# Patient Record
Sex: FEMALE | Race: BLACK OR AFRICAN AMERICAN | NOT HISPANIC OR LATINO | Employment: STUDENT | URBAN - METROPOLITAN AREA
[De-identification: names, ages, dates, MRNs, and addresses within clinical notes are randomized per-mention and may not be internally consistent; named-entity substitution may affect disease eponyms.]

---
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
## 2025-01-01 NOTE — TELEPHONE ENCOUNTER
Patient  seen today and diagnosed with strep throat. Patient has only had 1 dose of antibiotic and is not feeling better yet. She has vomited this evening as well. [facility] provided- home care. Instructed to call back with additional questions or worsening of symptoms. Patient verbalized understanding.       Reason for Disposition   [1] Taking antibiotic < 3 days for strep throat AND [2] other strep symptoms not improved    Additional Information   Negative: [1] Difficulty breathing AND [2] severe (struggling for each breath, unable to cry or speak, grunting sounds, severe retractions)   Negative: Fainted or too weak to stand   Negative: Sounds like a life-threatening emergency to the triager   Negative: Difficulty breathing (per caller) but not severe   Negative: [1] Drooling or spitting out saliva (because can't swallow) AND [2] new onset   Negative: [1] Drinking very little AND [2] signs of dehydration (no urine > 12 hours, very dry mouth, no tears, etc.)   Negative: [1] Can't move neck normally AND [2] fever   Negative: [1] Fever > 105 F (40.6 C) by any route OR axillary > 104 F (40 C) AND [2] took antibiotic > 24 hours   Negative: Child sounds very sick or weak to the triager   Negative: [1] Refuses to drink anything AND [2] for > 12 hours   Negative: [1] Can't move neck normally AND [2] no fever   Negative: [1] Age 6 years and older AND [2] complains they can't open mouth normally (without being asked)   Negative: Triager concerned about patient's response to recommended treatment plan   Negative: Pink or tea-colored urine   Negative: [1] Taking antibiotic > 24 hours AND [2] sore throat pain is SEVERE (interferes with function) AND [3] not improved with pain medicine or antibiotic   Negative: [1] Taking antibiotic > 48 hours for strep throat AND [2] fever persists or recurs   Negative: [1] Taking antibiotic > 3 days for strep throat AND [2] other strep symptoms not improved   Negative: [1] Taking antibiotic < 48  hours for strep throat AND [2] fever persists    Protocols used: Strep Throat Infection Follow-up Call-P-[facility]

## 2025-01-10 ENCOUNTER — NURSE TRIAGE (OUTPATIENT)
Dept: ADMINISTRATIVE | Facility: CLINIC | Age: 20
End: 2025-01-10
Payer: COMMERCIAL

## 2025-01-10 NOTE — TELEPHONE ENCOUNTER
"Pt states started on antibiotics 12/31 for strep, pt states has 3 doses left. Pt c/o hives, "red dots all over body, itching." Pt denies difficulty breathing, denies pain or fever. Per protocol, see physician within 24 hours. Pt in between Peds and Adult primary care-- does not have listed PCP at this time. Mom asking for appt with peds office, no appt available per chart. Discussed at home treatment and symptoms to monitor for and advised to call back for any further questions or concerns. Virtual visit offered, mom declines states wants pt to be seen.   Reason for Disposition   Taking new prescription antibiotic  (Exception: Finished taking new prescription antibiotic.)    Additional Information   [1] Drug rash suspected AND [2] started taking new medicine within last 2 weeks(Exception: Antihistamine, eye drops, ear drops, decongestant or other OTC cough/cold medicines.)   Negative: [1] Life-threatening reaction (anaphylaxis) in the past to the same drug AND [2] < 2 hours since exposure   Negative: Difficulty breathing or wheezing   Negative: [1] Hoarseness or cough AND [2] started soon after 1st dose of drug   Negative: [1] Swollen tongue AND [2] started soon after 1st dose of drug   Negative: [1] Purple or blood-colored rash (spots or dots) AND [2] fever   Negative: Sounds like a life-threatening emergency to the triager   Negative: Swollen tongue   Negative: [1] Widespread hives AND [2] onset < 2 hours of exposure to 1st dose of drug   Negative: Fever   Negative: Patient sounds very sick or weak to the triager   Negative: [1] Purple or blood-colored rash (spots or dots) AND [2] no fever AND [3] sounds well to triager   Negative: [1] Taking new prescription medication AND [2] rash within 4 hours of 1st dose   Negative: Large or small blisters on skin (i.e., fluid filled bubbles or sacs)   Negative: Bloody crusts on lips or sores in mouth   Negative: Face or lip swelling   Negative: Hives or itching    Protocols " used: Hives-A-AH, Rash - Widespread On Drugs-A-AH

## 2025-03-28 ENCOUNTER — HOSPITAL ENCOUNTER (EMERGENCY)
Facility: HOSPITAL | Age: 20
Discharge: HOME | End: 2025-03-28
Attending: PEDIATRICS
Payer: COMMERCIAL

## 2025-03-28 VITALS
HEART RATE: 70 BPM | WEIGHT: 104.63 LBS | SYSTOLIC BLOOD PRESSURE: 128 MMHG | TEMPERATURE: 99 F | BODY MASS INDEX: 20.54 KG/M2 | DIASTOLIC BLOOD PRESSURE: 88 MMHG | OXYGEN SATURATION: 98 % | HEIGHT: 60 IN | RESPIRATION RATE: 18 BRPM

## 2025-03-28 DIAGNOSIS — S16.1XXA STRAIN OF NECK MUSCLE, INITIAL ENCOUNTER: ICD-10-CM
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
## 2025-03-28 LAB
B-HCG UR QL: NEGATIVE
CTP QC/QA: YES

## 2025-03-28 PROCEDURE — 96372 THER/PROPH/DIAG INJ SC/IM: CPT

## 2025-03-28 PROCEDURE — 81025 URINE PREGNANCY TEST: CPT

## 2025-03-28 PROCEDURE — 99285 EMERGENCY DEPT VISIT HI MDM: CPT | Mod: 25

## 2025-03-28 PROCEDURE — 63600175 PHARM REV CODE 636 W HCPCS: Mod: JZ,TB

## 2025-03-28 RX ORDER — KETOROLAC TROMETHAMINE 30 MG/ML
15 INJECTION, SOLUTION INTRAMUSCULAR; INTRAVENOUS
Status: COMPLETED | OUTPATIENT
Start: 2025-03-28 | End: 2025-03-28

## 2025-03-28 RX ADMIN — KETOROLAC TROMETHAMINE 15 MG: 30 INJECTION, SOLUTION INTRAMUSCULAR; INTRAVENOUS at 11:03
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
## 2025-03-28 NOTE — Clinical Note
"Chilo "Tamera Castillo was seen and treated in our emergency department on 3/28/2025.  She may return to school on 03/31/2025.      If you have any questions or concerns, please don't hesitate to call.      Melissa Bryan, DO"
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
## 2025-05-12 ENCOUNTER — OFFICE VISIT (OUTPATIENT)
Dept: INTERNAL MEDICINE | Facility: CLINIC | Age: 20
End: 2025-05-12
Payer: COMMERCIAL

## 2025-05-12 VITALS
BODY MASS INDEX: 21.12 KG/M2 | HEIGHT: 60 IN | HEART RATE: 70 BPM | DIASTOLIC BLOOD PRESSURE: 64 MMHG | SYSTOLIC BLOOD PRESSURE: 116 MMHG | WEIGHT: 107.56 LBS | OXYGEN SATURATION: 99 %

## 2025-05-12 DIAGNOSIS — R10.30 LOWER ABDOMINAL PAIN: Primary | ICD-10-CM

## 2025-05-12 DIAGNOSIS — L72.0 EPIDERMAL INCLUSION CYST: ICD-10-CM

## 2025-05-12 DIAGNOSIS — N89.8 VAGINAL DISCHARGE: ICD-10-CM

## 2025-05-12 LAB
B-HCG UR QL: NEGATIVE
BILIRUB UR QL STRIP.AUTO: NEGATIVE
CLARITY UR: CLEAR
COLOR UR AUTO: YELLOW
CTP QC/QA: YES
GLUCOSE UR QL STRIP: NEGATIVE
HGB UR QL STRIP: NEGATIVE
KETONES UR QL STRIP: ABNORMAL
LEUKOCYTE ESTERASE UR QL STRIP: NEGATIVE
NITRITE UR QL STRIP: NEGATIVE
PH UR STRIP: 7 [PH]
PROT UR QL STRIP: ABNORMAL
SP GR UR STRIP: >=1.03
UROBILINOGEN UR STRIP-ACNC: ABNORMAL EU/DL

## 2025-05-12 PROCEDURE — 87591 N.GONORRHOEAE DNA AMP PROB: CPT | Performed by: NURSE PRACTITIONER

## 2025-05-12 PROCEDURE — 99999 PR PBB SHADOW E&M-EST. PATIENT-LVL IV: CPT | Mod: PBBFAC,,, | Performed by: NURSE PRACTITIONER

## 2025-05-12 PROCEDURE — 1159F MED LIST DOCD IN RCRD: CPT | Mod: CPTII,S$GLB,, | Performed by: NURSE PRACTITIONER

## 2025-05-12 PROCEDURE — 3008F BODY MASS INDEX DOCD: CPT | Mod: CPTII,S$GLB,, | Performed by: NURSE PRACTITIONER

## 2025-05-12 PROCEDURE — 81003 URINALYSIS AUTO W/O SCOPE: CPT | Performed by: NURSE PRACTITIONER

## 2025-05-12 PROCEDURE — 81025 URINE PREGNANCY TEST: CPT | Mod: S$GLB,,, | Performed by: NURSE PRACTITIONER

## 2025-05-12 PROCEDURE — 99204 OFFICE O/P NEW MOD 45 MIN: CPT | Mod: S$GLB,,, | Performed by: NURSE PRACTITIONER

## 2025-05-12 PROCEDURE — 1160F RVW MEDS BY RX/DR IN RCRD: CPT | Mod: CPTII,S$GLB,, | Performed by: NURSE PRACTITIONER

## 2025-05-12 PROCEDURE — 3074F SYST BP LT 130 MM HG: CPT | Mod: CPTII,S$GLB,, | Performed by: NURSE PRACTITIONER

## 2025-05-12 PROCEDURE — 3078F DIAST BP <80 MM HG: CPT | Mod: CPTII,S$GLB,, | Performed by: NURSE PRACTITIONER

## 2025-05-12 RX ORDER — DICYCLOMINE HYDROCHLORIDE 10 MG/1
10 CAPSULE ORAL 3 TIMES DAILY PRN
Qty: 30 CAPSULE | Refills: 0 | Status: SHIPPED | OUTPATIENT
Start: 2025-05-12 | End: 2025-06-11

## 2025-05-12 NOTE — PROGRESS NOTES
INTERNAL MEDICINE PROGRESS/URGENT CARE NOTE    CHIEF COMPLAINT     Chief Complaint   Patient presents with    Abdominal Pain       HPI     Chilo Castillo is a 19 y.o. female who presents for an urgent visit today.    New pt    Lower abd pain intermittent x 4 months. Nothing makes it worse or better. Sharp and cramping. Will last a few minutes and resolve on own. No associated fevers, diarrhea, constipation, blood in stool.    She's also been having vaginal discharge intermittently x years. Was treated for yeast in past but states doesn't fully resolve. No fevers or abn vaginal bleeding.   Discharge is white and watery.   She is sexually active.     LMP: 5/1/25    She has also had a nontender cyst on her scalp x years and would like it removed.       Problem List[1]     Past Medical History:  Past Medical History:   Diagnosis Date    Allergy     Bell's palsy 6    Headache(784.0)         Past Surgical History:  Past Surgical History:   Procedure Laterality Date    DENTAL SURGERY      REPAIR, HERNIA, VENTRAL N/A 3/28/2023    Procedure: REPAIR, HERNIA, VENTRAL;  Surgeon: Doris Mccain MD;  Location: [facility] OR [address] [place] [address];  Service: Pediatrics;  Laterality: N/A;        Allergies:  Review of patient's allergies indicates:  No Known Allergies    Home Medications:  Current Medications[2]     Review of Systems:  Review of Systems   Constitutional:  Negative for fever.   HENT:  Negative for congestion and sore throat.    Respiratory:  Negative for cough and shortness of breath.    Cardiovascular:  Negative for chest pain.   Gastrointestinal:  Positive for abdominal pain. Negative for blood in stool, constipation, diarrhea and vomiting.   Genitourinary:  Positive for vaginal discharge. Negative for dysuria, hematuria, pelvic pain and vaginal bleeding.   Musculoskeletal:  Negative for back pain.         PHYSICAL EXAM     Vitals:    05/12/25 1055   BP: 116/64   BP Location: Right arm   Patient Position: Sitting   Pulse: 70  "  SpO2: 99%   Weight: 48.8 kg (107 lb 9.4 oz)   Height: 5' (1.524 m)      Body mass index is 21.01 kg/m².     Physical Exam  Vitals reviewed.   Constitutional:       Appearance: Normal appearance.   HENT:      Head: Normocephalic.      Mouth/Throat:      Mouth: Mucous membranes are moist.      Pharynx: Oropharynx is clear.   Eyes:      Conjunctiva/sclera: Conjunctivae normal.      Pupils: Pupils are equal, round, and reactive to light.   Cardiovascular:      Rate and Rhythm: Normal rate and regular rhythm.   Pulmonary:      Effort: Pulmonary effort is normal.      Breath sounds: Normal breath sounds.   Abdominal:      General: Bowel sounds are normal.      Palpations: Abdomen is soft.      Tenderness: There is no guarding or rebound. Negative signs include Woods's sign, Rovsing's sign, psoas sign and obturator sign.      Comments: Slight tenderness to deep palpation only to RLQ and suprapubic area. Otherwise nontender   Skin:     General: Skin is warm and dry.   Neurological:      General: No focal deficit present.      Mental Status: She is alert.   Psychiatric:         Mood and Affect: Mood normal.         Behavior: Behavior normal.         LABS     No results found for: "LABA1C", "HGBA1C"  CMP  Sodium   Date Value Ref Range Status   03/21/2023 135 (L) 136 - 145 mmol/L Final     Potassium   Date Value Ref Range Status   03/21/2023 3.9 3.5 - 5.1 mmol/L Final     Chloride   Date Value Ref Range Status   03/21/2023 105 95 - 110 mmol/L Final     CO2   Date Value Ref Range Status   03/21/2023 19 (L) 23 - 29 mmol/L Final     Glucose   Date Value Ref Range Status   03/21/2023 87 70 - 110 mg/dL Final     BUN   Date Value Ref Range Status   03/21/2023 11 5 - 18 mg/dL Final     Creatinine   Date Value Ref Range Status   03/21/2023 0.7 0.5 - 1.4 mg/dL Final     Calcium   Date Value Ref Range Status   03/21/2023 8.8 8.7 - 10.5 mg/dL Final     Total Protein   Date Value Ref Range Status   03/21/2023 7.3 6.0 - 8.4 g/dL Final " "    Albumin   Date Value Ref Range Status   03/21/2023 4.0 3.2 - 4.7 g/dL Final     Total Bilirubin   Date Value Ref Range Status   03/21/2023 0.3 0.1 - 1.0 mg/dL Final     Comment:     For infants and newborns, interpretation of results should be based  on gestational age, weight and in agreement with clinical  observations.    Premature Infant recommended reference ranges:  Up to 24 hours.............<8.0 mg/dL  Up to 48 hours............<12.0 mg/dL  3-5 days..................<15.0 mg/dL  6-29 days.................<15.0 mg/dL       Alkaline Phosphatase   Date Value Ref Range Status   03/21/2023 40 (L) 48 - 95 U/L Final     AST   Date Value Ref Range Status   03/21/2023 23 10 - 40 U/L Final     ALT   Date Value Ref Range Status   03/21/2023 14 10 - 44 U/L Final     Anion Gap   Date Value Ref Range Status   03/21/2023 11 8 - 16 mmol/L Final     eGFR if    Date Value Ref Range Status   12/21/2020 SEE COMMENT >60 mL/min/1.73 m^2 Final     eGFR if non    Date Value Ref Range Status   12/21/2020 SEE COMMENT >60 mL/min/1.73 m^2 Final     Comment:     Calculation used to obtain the estimated glomerular filtration  rate (eGFR) is the CKD-EPI equation.   Test not performed.  GFR calculation is only valid for patients   18 and older.       Lab Results   Component Value Date    WBC 15.81 (H) 03/21/2023    HGB 11.1 (L) 03/21/2023    HCT 35.7 (L) 03/21/2023    MCV 71 (L) 03/21/2023     03/21/2023     No results found for: "CHOL"  No results found for: "HDL"  No results found for: "LDLCALC"  No results found for: "TRIG"  No results found for: "CHOLHDL"  Lab Results   Component Value Date    TSH 0.576 12/21/2020       ASSESSMENT     1. Lower abdominal pain    2. Vaginal discharge    3. Epidermal inclusion cyst           PLAN  Upt negative. Doubt acute abd given duration and lack of other symptoms. Trial bentyl.   Check UA and gc/chlamydia.   Referred to gyn for vaginosis swab.   Scalp " lesions appears to be epidermal cyst. Will refer to derm.     F/u with PCP in care team in 6 months to establish care.       1. Lower abdominal pain  - Urinalysis  - POCT urine pregnancy  - dicyclomine (BENTYL) 10 MG capsule; Take 1 capsule (10 mg total) by mouth 3 (three) times daily as needed (abdominal pain).  Dispense: 30 capsule; Refill: 0    2. Vaginal discharge  - C. trachomatis/N. gonorrhoeae by AMP DNA    3. Epidermal inclusion cyst  - Ambulatory referral/consult to Dermatology; Future       Follow up with PCP     Patient's plan/treatment was discussed including medications and possible side effects. Verbalized understanding of all instructions.     This note was partly generated with [employer] voice recognition software. I apologize for any possible typographical errors.          ALDO Nichols  Department of Internal Medicine - [facility]  05/12/2025          [1]   Patient Active Problem List  Diagnosis    Headache(784.0)    Seasonal allergies    Constipation    Abdominal pain    Ventral hernia   [2]   Current Outpatient Medications:     polyethylene glycol (GLYCOLAX) 17 gram PwPk, Take 17 g by mouth., Disp: , Rfl:     polyethylene glycol (GLYCOLAX) 17 gram/dose powder, Take 17 g by mouth once daily., Disp: 17 g, Rfl: 1    dicyclomine (BENTYL) 10 MG capsule, Take 1 capsule (10 mg total) by mouth 3 (three) times daily as needed (abdominal pain)., Disp: 30 capsule, Rfl: 0

## 2025-05-13 ENCOUNTER — LAB VISIT (OUTPATIENT)
Dept: LAB | Facility: HOSPITAL | Age: 20
End: 2025-05-13
Payer: COMMERCIAL

## 2025-05-13 ENCOUNTER — RESULTS FOLLOW-UP (OUTPATIENT)
Dept: INTERNAL MEDICINE | Facility: CLINIC | Age: 20
End: 2025-05-13

## 2025-05-13 DIAGNOSIS — R10.30 LOWER ABDOMINAL PAIN: Primary | ICD-10-CM

## 2025-05-13 DIAGNOSIS — R10.30 LOWER ABDOMINAL PAIN: ICD-10-CM

## 2025-05-13 LAB
ALBUMIN SERPL BCP-MCNC: 4.1 G/DL (ref 3.5–5.2)
ALP SERPL-CCNC: 39 UNIT/L (ref 40–150)
ALT SERPL W/O P-5'-P-CCNC: 28 UNIT/L (ref 10–44)
ANION GAP (OHS): 9 MMOL/L (ref 8–16)
AST SERPL-CCNC: 24 UNIT/L (ref 11–45)
BILIRUB SERPL-MCNC: 0.5 MG/DL (ref 0.1–1)
BUN SERPL-MCNC: 12 MG/DL (ref 6–20)
C TRACH DNA SPEC QL NAA+PROBE: NOT DETECTED
CALCIUM SERPL-MCNC: 9.2 MG/DL (ref 8.7–10.5)
CHLORIDE SERPL-SCNC: 107 MMOL/L (ref 95–110)
CO2 SERPL-SCNC: 23 MMOL/L (ref 23–29)
CREAT SERPL-MCNC: 0.7 MG/DL (ref 0.5–1.4)
CTGC SOURCE (OHS) ORD-325: NORMAL
GFR SERPLBLD CREATININE-BSD FMLA CKD-EPI: >60 ML/MIN/1.73/M2
GLUCOSE SERPL-MCNC: 76 MG/DL (ref 70–110)
N GONORRHOEA DNA UR QL NAA+PROBE: NOT DETECTED
POTASSIUM SERPL-SCNC: 4 MMOL/L (ref 3.5–5.1)
PROT SERPL-MCNC: 7.8 GM/DL (ref 6–8.4)
SODIUM SERPL-SCNC: 139 MMOL/L (ref 136–145)

## 2025-05-13 PROCEDURE — 84295 ASSAY OF SERUM SODIUM: CPT

## 2025-05-13 PROCEDURE — 36415 COLL VENOUS BLD VENIPUNCTURE: CPT

## 2025-05-14 ENCOUNTER — RESULTS FOLLOW-UP (OUTPATIENT)
Dept: INTERNAL MEDICINE | Facility: CLINIC | Age: 20
End: 2025-05-14

## 2025-07-25 ENCOUNTER — PATIENT MESSAGE (OUTPATIENT)
Dept: OBSTETRICS AND GYNECOLOGY | Facility: CLINIC | Age: 20
End: 2025-07-25
Payer: COMMERCIAL

## 2025-07-25 ENCOUNTER — CLINICAL SUPPORT (OUTPATIENT)
Dept: OBSTETRICS AND GYNECOLOGY | Facility: CLINIC | Age: 20
End: 2025-07-25
Payer: COMMERCIAL

## 2025-07-25 DIAGNOSIS — N91.2 AMENORRHEA: Primary | ICD-10-CM

## 2025-07-25 PROCEDURE — 99999 PR PBB SHADOW E&M-EST. PATIENT-LVL II: CPT | Mod: PBBFAC,,,

## 2025-07-25 NOTE — PROGRESS NOTES
Spoke with patient for approximately 30 minutes during OB navigator virtual visit.  Updated chart to reflect up to date patient demographics.  Allergies, medications, pharmacy, medical, surgical and family history updated. Substance and sexual activity, marital status, gender identity and OB/Gyn history updated.   Patient was guided through expectations of care during pregnancy. Pregnancy confirmation, dating u/s & first OB appts scheduled.  New pregnancy education provided & questions answered. Encouraged to send message or call office with any questions/concerns. Verbalized understanding.     Discussed with pt:    Lmp 6/8/25;   Encouraged to begin  taking PNV daily   denies n/v:   common in 1st tri  discussed safe options per Pregnancy A to Z guide   denies cramping/spotting:   may be normal to have mild cramping/light spotting, marguerite in 1st tri & with sexual activity  Precautions/warning signs discussed:   encouraged to go to ED for excessive vag d/c, saturating a pad, bright red bleeding, painful cramping worse than menstrual cramps/abd pain that is interrupting daily activity  Encouraged and discussed healthy diet:   nothing raw; meat well done; heat deli meats & hot dogs prior to eating   avoid soft cheeses that are not fully pasteurized-ex: brie, feta, blue cheese   avoid fish high in mercury-limit canned tuna to once per week   rinse fruits/veggies thoroughly    Encouraged adequate fluids:  8-12 cups of water/healthy liquids each day  Limit caffeine to <200 mg/day (approx 1-2 cups coffee, tea or soda)   Extra rest can be beneficial, especially during the 1st trimester when it's normal to feel tired   Sleep on side rather than back or abdomen, especially as pregnancy progresses  Ok to continue to exercise but should not start anything new or more strenuous  InBasket message to pt through pt portal with information regarding the [email or web address] [email or web address] for access to the Pregnancy A to Z guide and Prenatal  Class schedule. Informed of ConnectedMOM program & encouraged to participate, Get Ready to Meet Your Baby pamphlet, Coffective beti and how to obtain a breast pump through insurance toward end of pregnancy

## 2025-08-14 ENCOUNTER — HOSPITAL ENCOUNTER (OUTPATIENT)
Dept: PERINATAL CARE | Facility: OTHER | Age: 20
Discharge: HOME | End: 2025-08-14
Payer: COMMERCIAL

## 2025-08-14 ENCOUNTER — OFFICE VISIT (OUTPATIENT)
Dept: OBSTETRICS AND GYNECOLOGY | Facility: CLINIC | Age: 20
End: 2025-08-14
Payer: COMMERCIAL

## 2025-08-14 VITALS
DIASTOLIC BLOOD PRESSURE: 66 MMHG | WEIGHT: 111.31 LBS | SYSTOLIC BLOOD PRESSURE: 106 MMHG | HEIGHT: 61 IN | BODY MASS INDEX: 21.02 KG/M2

## 2025-08-14 DIAGNOSIS — N63.11 MASS OF UPPER OUTER QUADRANT OF RIGHT BREAST: ICD-10-CM

## 2025-08-14 DIAGNOSIS — N91.4 SECONDARY OLIGOMENORRHEA: ICD-10-CM

## 2025-08-14 DIAGNOSIS — N91.2 AMENORRHEA: ICD-10-CM

## 2025-08-14 DIAGNOSIS — Z32.01 POSITIVE PREGNANCY TEST: Primary | ICD-10-CM

## 2025-08-14 PROCEDURE — 87086 URINE CULTURE/COLONY COUNT: CPT | Performed by: FAMILY MEDICINE

## 2025-08-14 PROCEDURE — 76801 OB US < 14 WKS SINGLE FETUS: CPT

## 2025-08-14 PROCEDURE — 99999 PR PBB SHADOW E&M-EST. PATIENT-LVL III: CPT | Mod: PBBFAC,,, | Performed by: FAMILY MEDICINE

## 2025-08-14 PROCEDURE — 76801 OB US < 14 WKS SINGLE FETUS: CPT | Mod: 26,,, | Performed by: OBSTETRICS & GYNECOLOGY

## 2025-08-14 RX ORDER — PRENATAL WITH FERROUS FUM AND FOLIC ACID 3080; 920; 120; 400; 22; 1.84; 3; 20; 10; 1; 12; 200; 27; 25; 2 [IU]/1; [IU]/1; MG/1; [IU]/1; MG/1; MG/1; MG/1; MG/1; MG/1; MG/1; UG/1; MG/1; MG/1; MG/1; MG/1
1 TABLET ORAL DAILY
Qty: 90 TABLET | Refills: 2 | Status: SHIPPED | OUTPATIENT
Start: 2025-08-14 | End: 2026-08-14

## 2025-08-16 LAB — BACTERIA UR CULT: NO GROWTH

## 2025-09-02 DIAGNOSIS — R35.0 URINARY FREQUENCY: Primary | ICD-10-CM

## (undated) DEVICE — GOWN SURGICAL X-LARGE

## (undated) DEVICE — NDL STRAIGHT 4CM LEIBINGER

## (undated) DEVICE — COTTON BALLS 1/2IN

## (undated) DEVICE — GLOVE PI ULTRA TOUCH G SURGEON

## (undated) DEVICE — GOWN POLY REINF BRTH SLV XL

## (undated) DEVICE — SOL NS 1000CC

## (undated) DEVICE — ELECTRODE BLADE INSULATED 1 IN

## (undated) DEVICE — DRAPE PED LAP SURG 108X77IN

## (undated) DEVICE — DRAPE STERI-DRAPE 1000 17X11IN

## (undated) DEVICE — TRAY MINOR GEN SURG OMC

## (undated) DEVICE — DRESSING TRANS 2X2 TEGADERM